# Patient Record
Sex: MALE | Race: WHITE | Employment: OTHER | URBAN - NONMETROPOLITAN AREA
[De-identification: names, ages, dates, MRNs, and addresses within clinical notes are randomized per-mention and may not be internally consistent; named-entity substitution may affect disease eponyms.]

---

## 2017-01-04 ENCOUNTER — OFFICE VISIT (OUTPATIENT)
Dept: CARDIOLOGY | Age: 82
End: 2017-01-04
Payer: MEDICARE

## 2017-01-04 VITALS
HEART RATE: 68 BPM | DIASTOLIC BLOOD PRESSURE: 68 MMHG | SYSTOLIC BLOOD PRESSURE: 130 MMHG | BODY MASS INDEX: 31.15 KG/M2 | HEIGHT: 72 IN | WEIGHT: 230 LBS

## 2017-01-04 DIAGNOSIS — I25.10 CORONARY ARTERY DISEASE INVOLVING NATIVE CORONARY ARTERY OF NATIVE HEART WITHOUT ANGINA PECTORIS: Primary | ICD-10-CM

## 2017-01-04 DIAGNOSIS — I10 ESSENTIAL HYPERTENSION: ICD-10-CM

## 2017-01-04 PROCEDURE — 93000 ELECTROCARDIOGRAM COMPLETE: CPT | Performed by: CLINICAL NURSE SPECIALIST

## 2017-01-04 PROCEDURE — 99213 OFFICE O/P EST LOW 20 MIN: CPT | Performed by: CLINICAL NURSE SPECIALIST

## 2017-01-04 RX ORDER — METFORMIN HYDROCHLORIDE 500 MG/1
500 TABLET, EXTENDED RELEASE ORAL DAILY
COMMUNITY
Start: 2016-12-14 | End: 2020-07-20 | Stop reason: ALTCHOICE

## 2017-06-20 ENCOUNTER — TELEPHONE (OUTPATIENT)
Dept: CARDIOLOGY | Age: 82
End: 2017-06-20

## 2017-07-10 ENCOUNTER — OFFICE VISIT (OUTPATIENT)
Dept: CARDIOLOGY | Age: 82
End: 2017-07-10
Payer: MEDICARE

## 2017-07-10 VITALS
HEART RATE: 70 BPM | BODY MASS INDEX: 30.61 KG/M2 | SYSTOLIC BLOOD PRESSURE: 120 MMHG | HEIGHT: 72 IN | WEIGHT: 226 LBS | DIASTOLIC BLOOD PRESSURE: 68 MMHG

## 2017-07-10 DIAGNOSIS — I25.10 ASHD (ARTERIOSCLEROTIC HEART DISEASE): Primary | ICD-10-CM

## 2017-07-10 DIAGNOSIS — I10 ESSENTIAL HYPERTENSION: ICD-10-CM

## 2017-07-10 PROCEDURE — 99213 OFFICE O/P EST LOW 20 MIN: CPT | Performed by: INTERNAL MEDICINE

## 2017-07-10 RX ORDER — FINASTERIDE 5 MG/1
5 TABLET, FILM COATED ORAL DAILY
COMMUNITY
Start: 2017-06-28

## 2017-10-20 DIAGNOSIS — I71.40 AAA (ABDOMINAL AORTIC ANEURYSM) WITHOUT RUPTURE: ICD-10-CM

## 2017-10-20 DIAGNOSIS — I73.9 PERIPHERAL VASCULAR DISEASE (HCC): Primary | ICD-10-CM

## 2017-12-05 ENCOUNTER — OFFICE VISIT (OUTPATIENT)
Dept: VASCULAR SURGERY | Age: 82
End: 2017-12-05
Payer: MEDICARE

## 2017-12-05 ENCOUNTER — HOSPITAL ENCOUNTER (OUTPATIENT)
Dept: VASCULAR LAB | Age: 82
Discharge: HOME | End: 2017-12-05
Payer: MEDICARE

## 2017-12-05 ENCOUNTER — HOSPITAL ENCOUNTER (OUTPATIENT)
Dept: ULTRASOUND IMAGING | Age: 82
Discharge: HOME | End: 2017-12-05
Payer: MEDICARE

## 2017-12-05 VITALS — HEIGHT: 72 IN | DIASTOLIC BLOOD PRESSURE: 67 MMHG | SYSTOLIC BLOOD PRESSURE: 135 MMHG | HEART RATE: 61 BPM

## 2017-12-05 DIAGNOSIS — I65.23 BILATERAL CAROTID ARTERY STENOSIS: ICD-10-CM

## 2017-12-05 DIAGNOSIS — I73.9 PERIPHERAL VASCULAR DISEASE (HCC): ICD-10-CM

## 2017-12-05 DIAGNOSIS — I71.40 AAA (ABDOMINAL AORTIC ANEURYSM) WITHOUT RUPTURE: ICD-10-CM

## 2017-12-05 DIAGNOSIS — I71.40 AAA (ABDOMINAL AORTIC ANEURYSM) WITHOUT RUPTURE: Primary | ICD-10-CM

## 2017-12-05 DIAGNOSIS — I70.213 ATHEROSCLEROSIS OF NATIVE ARTERY OF BOTH LOWER EXTREMITIES WITH INTERMITTENT CLAUDICATION (HCC): ICD-10-CM

## 2017-12-05 PROCEDURE — 93880 EXTRACRANIAL BILAT STUDY: CPT

## 2017-12-05 PROCEDURE — G8598 ASA/ANTIPLAT THER USED: HCPCS | Performed by: NURSE PRACTITIONER

## 2017-12-05 PROCEDURE — G8417 CALC BMI ABV UP PARAM F/U: HCPCS | Performed by: NURSE PRACTITIONER

## 2017-12-05 PROCEDURE — 93923 UPR/LXTR ART STDY 3+ LVLS: CPT

## 2017-12-05 PROCEDURE — G8484 FLU IMMUNIZE NO ADMIN: HCPCS | Performed by: NURSE PRACTITIONER

## 2017-12-05 PROCEDURE — 4040F PNEUMOC VAC/ADMIN/RCVD: CPT | Performed by: NURSE PRACTITIONER

## 2017-12-05 PROCEDURE — 1036F TOBACCO NON-USER: CPT | Performed by: NURSE PRACTITIONER

## 2017-12-05 PROCEDURE — 76775 US EXAM ABDO BACK WALL LIM: CPT

## 2017-12-05 PROCEDURE — 1123F ACP DISCUSS/DSCN MKR DOCD: CPT | Performed by: NURSE PRACTITIONER

## 2017-12-05 PROCEDURE — 99213 OFFICE O/P EST LOW 20 MIN: CPT | Performed by: NURSE PRACTITIONER

## 2017-12-05 PROCEDURE — G8427 DOCREV CUR MEDS BY ELIG CLIN: HCPCS | Performed by: NURSE PRACTITIONER

## 2017-12-05 NOTE — PROGRESS NOTES
Bilateral carotid artery stenosis          Plan    Start/Continue ASA EC 81 mg daily  Education about caludication causes and treatment discussed  Call with any new wound development or progressive claudication  Walking program  Leg elevation  Good moisturization  Good skin care  Follow up in  1  Year(s) with cvls and amy and aortic u/s  Recommend no smoking  Strongly encourage statin therapy

## 2018-01-08 ENCOUNTER — TELEPHONE (OUTPATIENT)
Dept: CARDIOLOGY | Age: 83
End: 2018-01-08

## 2018-01-09 ENCOUNTER — OFFICE VISIT (OUTPATIENT)
Dept: CARDIOLOGY | Age: 83
End: 2018-01-09
Payer: MEDICARE

## 2018-01-09 VITALS
WEIGHT: 230 LBS | BODY MASS INDEX: 31.15 KG/M2 | DIASTOLIC BLOOD PRESSURE: 60 MMHG | SYSTOLIC BLOOD PRESSURE: 130 MMHG | HEART RATE: 70 BPM | HEIGHT: 72 IN

## 2018-01-09 DIAGNOSIS — I10 ESSENTIAL HYPERTENSION: ICD-10-CM

## 2018-01-09 DIAGNOSIS — I25.10 CORONARY ARTERY DISEASE INVOLVING NATIVE CORONARY ARTERY OF NATIVE HEART WITHOUT ANGINA PECTORIS: Primary | ICD-10-CM

## 2018-01-09 PROCEDURE — G8598 ASA/ANTIPLAT THER USED: HCPCS | Performed by: CLINICAL NURSE SPECIALIST

## 2018-01-09 PROCEDURE — G8484 FLU IMMUNIZE NO ADMIN: HCPCS | Performed by: CLINICAL NURSE SPECIALIST

## 2018-01-09 PROCEDURE — G8427 DOCREV CUR MEDS BY ELIG CLIN: HCPCS | Performed by: CLINICAL NURSE SPECIALIST

## 2018-01-09 PROCEDURE — 1123F ACP DISCUSS/DSCN MKR DOCD: CPT | Performed by: CLINICAL NURSE SPECIALIST

## 2018-01-09 PROCEDURE — 1036F TOBACCO NON-USER: CPT | Performed by: CLINICAL NURSE SPECIALIST

## 2018-01-09 PROCEDURE — G8417 CALC BMI ABV UP PARAM F/U: HCPCS | Performed by: CLINICAL NURSE SPECIALIST

## 2018-01-09 PROCEDURE — 99213 OFFICE O/P EST LOW 20 MIN: CPT | Performed by: CLINICAL NURSE SPECIALIST

## 2018-01-09 PROCEDURE — 4040F PNEUMOC VAC/ADMIN/RCVD: CPT | Performed by: CLINICAL NURSE SPECIALIST

## 2018-01-09 RX ORDER — TIMOLOL MALEATE 5 MG/ML
1 SOLUTION/ DROPS OPHTHALMIC DAILY
COMMUNITY
Start: 2017-10-23

## 2018-01-09 NOTE — PROGRESS NOTES
[facility], [address] [facility] [address] [place] [address]  Phone: [phone]  Fax: [phone]    OFFICE VISIT:  2018    Tristan Diaz - : 1925    Reason For Visit:  Lilian Pollack is a 80 y.o. male who is here for 6 Month Follow-Up (Patient denies any cardiac symptoms.) and Coronary Artery Disease  1999 CABG x4  2011- negative stress test     Subjective  Lilian Pollack denies exertional chest pain, shortness of breath, orthopnea, paroxysmal nocturnal dyspnea, syncope, presyncope, arrhythmia. He has some mile ankel edema. The patient denies numbness or weakness to suggest cerebrovascular accident or transient ischemic attack. [facility] Suarez [facility]. Tristan Diaz has the following history as recorded in [facility]:    Patient Active Problem List    Diagnosis Date Noted    Atherosclerosis of native artery of both lower extremities with intermittent claudication ([address] 2015    Carotid artery stenosis 2012    AAA (abdominal aortic aneurysm) without rupture ([address] 2012    CAD (coronary artery disease)     Hyperlipidemia     Hypertension     Peripheral vascular disease ([address]      Past Medical History:   Diagnosis Date    AAA (abdominal aortic aneurysm) without rupture (HCC)     Angina      Arrhythmia     with both atrial and ventricular premature.  ASCVD (arteriosclerotic cardiovascular disease)     Atrophy of prostate     BPH (benign prostatic hyperplasia)     with chronic obstructive symptoms.  CAD (coronary artery disease)     s/p CABG, Dr. Thresa Halsted.  Carotid artery occlusion     Chronic kidney disease     Elevated CK     with borderline elevated troponin I with no evidence of acute MI.    Fatigue     Hyperlipidemia     Hypertension     IHD (ischemic heart disease)     hx of    Normocytic anemia     consistent with anemia of chronic disease.     Obesity     [facility]     PVD (peripheral vascular disease) [facility]     SOB (shortness of breath)     Tobacco abuse     remote    UTI (urinary tract infection) 04/2011     Past Surgical History:   Procedure Laterality Date    CARDIAC CATHETERIZATION  06/21/1999     EF 70%. See scanned document.  CAROTID ENDARTERECTOMY  2003    right    CORONARY ARTERY BYPASS GRAFT  1999    4 vessel    INGUINAL HERNIA REPAIR  2004    right     Family History   Problem Relation Age of Onset    Heart Attack Mother     Other Brother      ruptured AAA    Other Other      Family hx of Emphysema and Hypertension. Social History   Substance Use Topics    Smoking status: Former Smoker     Quit date: 8/16/1960    Smokeless tobacco: Never Used    Alcohol use No      Current Outpatient Prescriptions   Medication Sig Dispense Refill    timolol (TIMOPTIC) 0.5 % ophthalmic solution Place 1 drop into both eyes daily       finasteride (PROSCAR) 5 MG tablet 5 mg      metFORMIN (GLUCOPHAGE-XR) 500 MG extended release tablet Take 500 mg by mouth daily      hydrochlorothiazide (HYDRODIURIL) 25 MG tablet Take 25 mg by mouth daily      losartan (COZAAR) 25 MG tablet Take 25 mg by mouth daily      atenolol (TENORMIN) 50 MG tablet Take 50 mg by mouth daily.  pravastatin (PRAVACHOL) 40 MG tablet Take 40 mg by mouth daily.  Multiple Vitamins-Minerals (CENTRUM SILVER PO) Take  by mouth daily.  aspirin 81 MG EC tablet Take 81 mg by mouth daily.  terazosin (HYTRIN) 5 MG capsule Take 5 mg by mouth nightly.  isosorbide mononitrate (IMDUR) 30 MG CR tablet Take 30 mg by mouth daily. No current facility-administered medications for this visit. Allergies: Lisinopril    Review of Systems  Constitutional  no significant activity change, appetite change, or unexpected weight change. No fever, chills or diaphoresis. No fatigue. HEENT  no significant rhinorrhea or epistaxis.  No tinnitus or significant

## 2018-01-09 NOTE — PATIENT INSTRUCTIONS
Follow up in 6 mos With Dr. Juany Fletcher  Call with any questions or concerns  Follow up with Joellen Azevedo for non cardiac problems  Report any new problems  Cardiovascular Fitness-Exercise as tolerated. Strive for 15 minutes of exercise most days of the week. Cardiac / Healthy Diet  Continue current medications as directed  Continue plan of treatment  It is always recommended that you bring your medications bottles with you to each visit - this is for your safety!

## 2018-07-09 ENCOUNTER — OFFICE VISIT (OUTPATIENT)
Dept: CARDIOLOGY | Age: 83
End: 2018-07-09
Payer: MEDICARE

## 2018-07-09 VITALS
HEART RATE: 68 BPM | BODY MASS INDEX: 31.15 KG/M2 | WEIGHT: 230 LBS | SYSTOLIC BLOOD PRESSURE: 130 MMHG | DIASTOLIC BLOOD PRESSURE: 70 MMHG | HEIGHT: 72 IN

## 2018-07-09 DIAGNOSIS — I10 ESSENTIAL HYPERTENSION: ICD-10-CM

## 2018-07-09 DIAGNOSIS — I25.10 ATHEROSCLEROTIC CARDIOVASCULAR DISEASE: Primary | ICD-10-CM

## 2018-07-09 PROCEDURE — 4040F PNEUMOC VAC/ADMIN/RCVD: CPT | Performed by: INTERNAL MEDICINE

## 2018-07-09 PROCEDURE — 1123F ACP DISCUSS/DSCN MKR DOCD: CPT | Performed by: INTERNAL MEDICINE

## 2018-07-09 PROCEDURE — G8598 ASA/ANTIPLAT THER USED: HCPCS | Performed by: INTERNAL MEDICINE

## 2018-07-09 PROCEDURE — 1036F TOBACCO NON-USER: CPT | Performed by: INTERNAL MEDICINE

## 2018-07-09 PROCEDURE — 99213 OFFICE O/P EST LOW 20 MIN: CPT | Performed by: INTERNAL MEDICINE

## 2018-07-09 PROCEDURE — 1101F PT FALLS ASSESS-DOCD LE1/YR: CPT | Performed by: INTERNAL MEDICINE

## 2018-07-09 PROCEDURE — G8427 DOCREV CUR MEDS BY ELIG CLIN: HCPCS | Performed by: INTERNAL MEDICINE

## 2018-07-09 PROCEDURE — G8417 CALC BMI ABV UP PARAM F/U: HCPCS | Performed by: INTERNAL MEDICINE

## 2018-07-09 RX ORDER — HYDROCHLOROTHIAZIDE 12.5 MG/1
12.5 TABLET ORAL DAILY
COMMUNITY

## 2018-07-16 NOTE — PROGRESS NOTES
[address] [facility], [address] [place] [address], [address] [place] [address], [address] [place]  The following was transcribed by Peterson Slaughter M.T. Martha Lee : 1925, 80 y.o. Male        Office Visit:  2018    Chief Complaint   Patient presents with    1 Year Follow Up     thinks he is doing very well. States he is a [employer] man, [place].  Coronary Artery Disease     Reason for visit:   1. Follow up of coronary artery disease and hypertension. History of Present Illness:   Mr. Martha Lee is 80years old and he reminds me he was in the [place], in the [place] during [employer]. He has no angina, no unusual dyspnea, paroxysmal nocturnal dyspnea, orthopnea, palpitations, syncope or near syncope. Patient Active Problem List   Diagnosis Code    CAD (coronary artery disease) I25.10    Hyperlipidemia E78.5    Hypertension I10    Peripheral vascular disease ([address] I73.9    Carotid artery stenosis I65.29    AAA (abdominal aortic aneurysm) without rupture ([facility] I71.4    Atherosclerosis of native artery of both lower extremities with intermittent claudication (HCC) P07.120     Past Medical History:   Diagnosis Date    AAA (abdominal aortic aneurysm) without rupture ([facility])     Angina      Arrhythmia     with both atrial and ventricular premature.  ASCVD (arteriosclerotic cardiovascular disease)     Atrophy of prostate     BPH (benign prostatic hyperplasia)     with chronic obstructive symptoms.  CAD (coronary artery disease)     s/p CABG, Dr. Manjinder Veloz.  Carotid artery occlusion     Chronic kidney disease     Elevated CK     with borderline elevated troponin I with no evidence of acute MI.    Fatigue     Hyperlipidemia     Hypertension     IHD (ischemic heart disease)     hx of    Normocytic anemia     consistent with anemia of chronic disease.     Obesity     Peripheral vascular disease ([address]     PVD (peripheral vascular disease) ([address]  SOB (shortness of breath)     Tobacco abuse     remote    UTI (urinary tract infection) 04/2011     Past Surgical History:   Procedure Laterality Date    CARDIAC CATHETERIZATION  06/21/1999     EF 70%. See scanned document.  CAROTID ENDARTERECTOMY  2003    right    CORONARY ARTERY BYPASS GRAFT  1999    4 vessel    INGUINAL HERNIA REPAIR  2004    right      His family history includes Heart Attack in his mother; Other in his brother and another family member. Social History   Substance Use Topics    Smoking status: Former Smoker     Quit date: 8/16/1960    Smokeless tobacco: Never Used    Alcohol use No      Allergies   Allergen Reactions    Lisinopril Swelling     Outpatient Prescriptions Marked as Taking for the 7/9/18 encounter (Office Visit) with AIMEE Fair MD   Medication Sig Dispense Refill    hydrochlorothiazide (HYDRODIURIL) 12.5 MG tablet Take 12.5 mg by mouth daily      timolol (TIMOPTIC) 0.5 % ophthalmic solution Place 1 drop into both eyes daily       finasteride (PROSCAR) 5 MG tablet 5 mg      metFORMIN (GLUCOPHAGE-XR) 500 MG extended release tablet Take 500 mg by mouth daily      losartan (COZAAR) 25 MG tablet Take 25 mg by mouth daily      atenolol (TENORMIN) 50 MG tablet Take 50 mg by mouth daily.  pravastatin (PRAVACHOL) 40 MG tablet Take 40 mg by mouth daily.  Multiple Vitamins-Minerals (CENTRUM SILVER PO) Take  by mouth daily.  aspirin 81 MG EC tablet Take 81 mg by mouth daily.  terazosin (HYTRIN) 5 MG capsule Take 5 mg by mouth nightly.  isosorbide mononitrate (IMDUR) 30 MG CR tablet Take 30 mg by mouth daily. I have reviewed and confirm the Past Medical History, Allergies, and Medications sections above and have updated the computerized patient record.      Data:   BP Readings from Last 3 Encounters:   07/09/18 130/70   01/09/18 130/60   12/05/17 135/67    Pulse Readings from Last 3 Encounters:   07/09/18 68   01/09/18 70 12/05/17 61        Review of Systems - As per HPI, otherwise negative. Ten organ review performed. Physical Exam:  Vital Signs:  /70   Pulse 68   Ht 6' (1.829 m)   Wt 230 lb (104.3 kg)   BMI 31.19 kg/m²   Constitutional:  The patient is a pleasant 80 y.o. male in no acute distress. He appears well-developed and well-nourished. HEAD:  Normocephalic without evidence of old or recent trauma. EYES:  Sclerae clear. Conjunctivae pink. EOMs intact. Pupils equal and round. NOSE:  No nasal discharge or epistaxis. MOUTH:  Teeth, gums and palate normal.   THROAT:  No lesions on lips or buccal mucosa. Tongue protrudes in midline and is well papillated. NECK:  There are no carotid bruits. No noted jugular venous distention. No thyromegaly. CHEST:  Clear bilateral breath sounds without wheezes or rhonchi. RESPIRATORY:  The lungs are clear. CARDIOVASCULAR:  The heart's rhythm is regular without audible murmurs or gallops. ABDOMEN:  The abdomen is soft without tenderness or mass. UPPER EXTREMITY EVALUATION:  Radial pulses palpable bilaterally. LOWER EXTREMITY EVALUATION:  Negative for peripheral edema. Femoral, popliteal, dorsalis pedis, and posterior tibialis pulses 2+ to palpation bilaterally. No cyanosis or clubbing. MUSCULOSKELETAL:  Normal muscle strength and tone. No atrophy or abnormalities. SKIN:  Warm, dry, intact. No dermatitis or ulcers. NEUROLOGIC:  Intact cranial nerves II through XII and no focal weakness. Impression / Plan:   1. Coronary artery disease - asymptomatic on current therapy as listed. 2.  Hypertension - blood pressure is well controlled on current therapy as listed. 3.  Continue current medications as prescribed. 4.  Wellness visit in six months. Return to see me in one year.                     _____________________________________________________  Electronically Signed by:   AIMEE Hui M.D., FNicoleACASSIE    [facility]  _____________________________________________________  Copy to pcp: Rubia Roy M.D.

## 2018-12-13 ENCOUNTER — HOSPITAL ENCOUNTER (OUTPATIENT)
Dept: ULTRASOUND IMAGING | Age: 83
Discharge: HOME | End: 2018-12-13
Payer: MEDICARE

## 2018-12-13 ENCOUNTER — HOSPITAL ENCOUNTER (OUTPATIENT)
Dept: VASCULAR LAB | Age: 83
Discharge: HOME | End: 2018-12-13
Payer: MEDICARE

## 2018-12-13 ENCOUNTER — OFFICE VISIT (OUTPATIENT)
Dept: VASCULAR SURGERY | Age: 83
End: 2018-12-13
Payer: MEDICARE

## 2018-12-13 VITALS
DIASTOLIC BLOOD PRESSURE: 76 MMHG | TEMPERATURE: 97.5 F | SYSTOLIC BLOOD PRESSURE: 130 MMHG | HEART RATE: 64 BPM | OXYGEN SATURATION: 97 % | RESPIRATION RATE: 18 BRPM

## 2018-12-13 DIAGNOSIS — I70.213 ATHEROSCLEROSIS OF NATIVE ARTERY OF BOTH LOWER EXTREMITIES WITH INTERMITTENT CLAUDICATION (HCC): ICD-10-CM

## 2018-12-13 DIAGNOSIS — I71.40 AAA (ABDOMINAL AORTIC ANEURYSM) WITHOUT RUPTURE: ICD-10-CM

## 2018-12-13 DIAGNOSIS — I65.23 BILATERAL CAROTID ARTERY STENOSIS: ICD-10-CM

## 2018-12-13 DIAGNOSIS — I73.9 PERIPHERAL VASCULAR DISEASE (HCC): Primary | ICD-10-CM

## 2018-12-13 PROCEDURE — 1123F ACP DISCUSS/DSCN MKR DOCD: CPT | Performed by: PHYSICIAN ASSISTANT

## 2018-12-13 PROCEDURE — 4040F PNEUMOC VAC/ADMIN/RCVD: CPT | Performed by: PHYSICIAN ASSISTANT

## 2018-12-13 PROCEDURE — 93880 EXTRACRANIAL BILAT STUDY: CPT

## 2018-12-13 PROCEDURE — 1036F TOBACCO NON-USER: CPT | Performed by: PHYSICIAN ASSISTANT

## 2018-12-13 PROCEDURE — 1101F PT FALLS ASSESS-DOCD LE1/YR: CPT | Performed by: PHYSICIAN ASSISTANT

## 2018-12-13 PROCEDURE — G8598 ASA/ANTIPLAT THER USED: HCPCS | Performed by: PHYSICIAN ASSISTANT

## 2018-12-13 PROCEDURE — 93978 VASCULAR STUDY: CPT

## 2018-12-13 PROCEDURE — 99213 OFFICE O/P EST LOW 20 MIN: CPT | Performed by: PHYSICIAN ASSISTANT

## 2018-12-13 PROCEDURE — G8417 CALC BMI ABV UP PARAM F/U: HCPCS | Performed by: PHYSICIAN ASSISTANT

## 2018-12-13 PROCEDURE — G8427 DOCREV CUR MEDS BY ELIG CLIN: HCPCS | Performed by: PHYSICIAN ASSISTANT

## 2018-12-13 PROCEDURE — 93923 UPR/LXTR ART STDY 3+ LVLS: CPT

## 2018-12-13 PROCEDURE — G8484 FLU IMMUNIZE NO ADMIN: HCPCS | Performed by: PHYSICIAN ASSISTANT

## 2018-12-13 NOTE — PROGRESS NOTES
no significant shortness of breath, wheezing, or stridor. No apnea, cough, or chest tightness associated with shortness of breath. Cardiovascular - no chest pain, syncope, or significant dizziness. No palpitations or significant leg swelling. Patient reports   no claudication. Gastrointestinal - no abdominal swelling or pain. No blood in stool. No severe constipation, diarrhea, nausea, or vomiting. Genitourinary - No difficulty urinating, dysuria, frequency, or urgency. No flank pain or hematuria. Musculoskeletal - no back pain, gait disturbance, or myalgia. Skin - no color change, rash, pallor, or new wound. Neurologic - no dizziness, facial asymmetry, or light headedness. No seizures. No speech difficulty or lateralizing weakness. Hematologic - no easy bruising or excessive bleeding. Psychiatric - no severe anxiety or nervousness. No confusion. All other review of systems are negative. Physical Exam    /76 Comment: LEFT  Pulse 64   Temp 97.5 °F (36.4 °C)   Resp 18   SpO2 97%     Constitutional - well developed, well nourished. No diaphoresis or acute distress. HENT - head normocephalic. Right external ear canal appears normal.  Left external ear canal appears normal.  Septum appears midline. Eyes - conjunctiva normal.  EOMS normal.  No exudate. No icterus. Neck- ROM appears normal, no tracheal deviation. Cardiovascular - Regular rate and rhythm. Heart sounds are normal.  No murmur, rub, or gallop. Carotid pulses are 2+ to palpation bilaterally without bruit. Extremities - Radial and brachial pulses are 2+ to palpation bilaterally. Femoral pulses are palpable. DP pulse right foot. Left DP and bilateral PT pulses are NP. No cyanosis, clubbing, or significant edema. No signs atheroembolic event. Pulmonary - effort appears normal.  No respiratory distress. Lungs - Breath sounds normal. No wheezes or rales. GI - Abdomen - soft, non tender, bowel sounds X 4 quadrants. No guarding or rebound tenderness. No distension or palpable mass. Genitourinary - deferred. Musculoskeletal - ROM appears normal.  No significant edema. Neurologic - alert and oriented X 3. Physiologic. Skin - warm, dry, and intact. No rash, erythema, or pallor. Psychiatric - mood, affect, and behavior appear normal.  Judgment and thought processes appear normal.    Risk factors for atherosclerosis of all vascular beds have been reviewed with the patient including:  Family history, tobacco abuse in all forms, elevated cholesterol, hyperlipidemia, and diabetes. Lower extremity arterial study:   Right CITLALY NC, Left CITLALY 1.55. Individual films reviewed: Yes. Test results were reviewed with the patient. Disease process is stable      Doppler results:    Right CCA/ICA <50% stenotic  Left CCA/ICA <50% stenotic  Right verterbral artery flow is antegrade  Left verterbral artery flow is antegrade  Individual velocities reviewed: Yes. Results were reviewed with the patient. Disease process is stable    Aortic ultrasound:  3.5 cm abdominal aortic aneurysm. This aneurysm has not changed since the last visit. Individual films reviewed:  Yes. These results have been reviewed with the patient. Options have been discussed with the patient including continued medical management. Patient has opted to proceed with continued medical management. Assessment    1. Peripheral vascular disease ([address]    2. AAA (abdominal aortic aneurysm) without rupture ([address]    3.  Bilateral carotid artery stenosis          Plan    Start/Continue ASA EC 81 mg daily  Strongly encourage statin therapy  Walk as much as possible   Good moisturization  Good skin care  Recommend no smoking    Follow up in  1  Year(s) with cvls and amy and aortic u/s

## 2019-01-14 ENCOUNTER — OFFICE VISIT (OUTPATIENT)
Dept: CARDIOLOGY | Age: 84
End: 2019-01-14
Payer: MEDICARE

## 2019-01-14 VITALS
SYSTOLIC BLOOD PRESSURE: 118 MMHG | DIASTOLIC BLOOD PRESSURE: 62 MMHG | HEART RATE: 68 BPM | BODY MASS INDEX: 30.34 KG/M2 | HEIGHT: 72 IN | WEIGHT: 224 LBS

## 2019-01-14 DIAGNOSIS — I65.23 BILATERAL CAROTID ARTERY STENOSIS: ICD-10-CM

## 2019-01-14 DIAGNOSIS — I25.10 CORONARY ARTERY DISEASE INVOLVING NATIVE CORONARY ARTERY OF NATIVE HEART WITHOUT ANGINA PECTORIS: Primary | ICD-10-CM

## 2019-01-14 DIAGNOSIS — I10 ESSENTIAL HYPERTENSION: ICD-10-CM

## 2019-01-14 DIAGNOSIS — E78.2 MIXED HYPERLIPIDEMIA: ICD-10-CM

## 2019-01-14 DIAGNOSIS — I71.40 AAA (ABDOMINAL AORTIC ANEURYSM) WITHOUT RUPTURE: ICD-10-CM

## 2019-01-14 PROCEDURE — 4040F PNEUMOC VAC/ADMIN/RCVD: CPT | Performed by: CLINICAL NURSE SPECIALIST

## 2019-01-14 PROCEDURE — G8598 ASA/ANTIPLAT THER USED: HCPCS | Performed by: CLINICAL NURSE SPECIALIST

## 2019-01-14 PROCEDURE — G8484 FLU IMMUNIZE NO ADMIN: HCPCS | Performed by: CLINICAL NURSE SPECIALIST

## 2019-01-14 PROCEDURE — 99213 OFFICE O/P EST LOW 20 MIN: CPT | Performed by: CLINICAL NURSE SPECIALIST

## 2019-01-14 PROCEDURE — G8417 CALC BMI ABV UP PARAM F/U: HCPCS | Performed by: CLINICAL NURSE SPECIALIST

## 2019-01-14 PROCEDURE — 1101F PT FALLS ASSESS-DOCD LE1/YR: CPT | Performed by: CLINICAL NURSE SPECIALIST

## 2019-01-14 PROCEDURE — 93000 ELECTROCARDIOGRAM COMPLETE: CPT | Performed by: CLINICAL NURSE SPECIALIST

## 2019-01-14 PROCEDURE — G8427 DOCREV CUR MEDS BY ELIG CLIN: HCPCS | Performed by: CLINICAL NURSE SPECIALIST

## 2019-01-14 PROCEDURE — 1123F ACP DISCUSS/DSCN MKR DOCD: CPT | Performed by: CLINICAL NURSE SPECIALIST

## 2019-01-14 PROCEDURE — 1036F TOBACCO NON-USER: CPT | Performed by: CLINICAL NURSE SPECIALIST

## 2019-01-14 ASSESSMENT — ENCOUNTER SYMPTOMS
FACIAL SWELLING: 0
WHEEZING: 0
COUGH: 0
SHORTNESS OF BREATH: 0
ABDOMINAL PAIN: 0
EYE REDNESS: 0
NAUSEA: 0
CHEST TIGHTNESS: 0
VOMITING: 0

## 2019-07-15 ENCOUNTER — OFFICE VISIT (OUTPATIENT)
Dept: CARDIOLOGY | Age: 84
End: 2019-07-15
Payer: MEDICARE

## 2019-07-15 VITALS
DIASTOLIC BLOOD PRESSURE: 52 MMHG | SYSTOLIC BLOOD PRESSURE: 100 MMHG | HEART RATE: 64 BPM | HEIGHT: 72 IN | WEIGHT: 226 LBS | BODY MASS INDEX: 30.61 KG/M2

## 2019-07-15 DIAGNOSIS — I10 ESSENTIAL HYPERTENSION: ICD-10-CM

## 2019-07-15 DIAGNOSIS — I25.10 CORONARY ARTERY DISEASE INVOLVING NATIVE CORONARY ARTERY OF NATIVE HEART WITHOUT ANGINA PECTORIS: Primary | ICD-10-CM

## 2019-07-15 DIAGNOSIS — E78.2 MIXED HYPERLIPIDEMIA: ICD-10-CM

## 2019-07-15 DIAGNOSIS — I65.23 BILATERAL CAROTID ARTERY STENOSIS: ICD-10-CM

## 2019-07-15 DIAGNOSIS — I71.40 AAA (ABDOMINAL AORTIC ANEURYSM) WITHOUT RUPTURE: ICD-10-CM

## 2019-07-15 PROCEDURE — 4040F PNEUMOC VAC/ADMIN/RCVD: CPT | Performed by: CLINICAL NURSE SPECIALIST

## 2019-07-15 PROCEDURE — G8427 DOCREV CUR MEDS BY ELIG CLIN: HCPCS | Performed by: CLINICAL NURSE SPECIALIST

## 2019-07-15 PROCEDURE — 1123F ACP DISCUSS/DSCN MKR DOCD: CPT | Performed by: CLINICAL NURSE SPECIALIST

## 2019-07-15 PROCEDURE — 99213 OFFICE O/P EST LOW 20 MIN: CPT | Performed by: CLINICAL NURSE SPECIALIST

## 2019-07-15 PROCEDURE — G8417 CALC BMI ABV UP PARAM F/U: HCPCS | Performed by: CLINICAL NURSE SPECIALIST

## 2019-07-15 PROCEDURE — G8598 ASA/ANTIPLAT THER USED: HCPCS | Performed by: CLINICAL NURSE SPECIALIST

## 2019-07-15 PROCEDURE — 1036F TOBACCO NON-USER: CPT | Performed by: CLINICAL NURSE SPECIALIST

## 2019-07-15 ASSESSMENT — ENCOUNTER SYMPTOMS
EYE REDNESS: 0
WHEEZING: 0
CHEST TIGHTNESS: 0
NAUSEA: 0
COUGH: 0
VOMITING: 0
FACIAL SWELLING: 0
ABDOMINAL PAIN: 0
SHORTNESS OF BREATH: 0

## 2019-07-15 NOTE — PATIENT INSTRUCTIONS
Return in about 6 months (around 1/15/2020) for APRN. Call with any questionsor concerns  Follow up with Adela Camilo for non cardiac problems  Report any new problems  Cardiovascular Fitness-Exercise as tolerated. Strive for 15 minutes of exercise most days of the week. Cardiac / HealthyDiet  Continue current medications as directed  Continue plan of treatment  It is always recommended that you bring your medicationsbottles with you to each visit - this is for your safety! Patient Education        Learning About Coronary Artery Disease (CAD)  What is coronary artery disease? Coronary artery disease (CAD) occurs when plaque builds up in the arteries that bring oxygen-rich blood to your heart. Plaque is a fatty substance made of cholesterol, calcium, and other substances in the blood. This process is called hardening of the arteries, or atherosclerosis. What happens when you have coronary artery disease? · Plaque may narrow the coronary arteries. Narrowed arteries cause poor blood flow. This can lead to angina symptoms such as chest pain or discomfort. If blood flow is completely blocked, you could have a heart attack. · You can slow CAD and reduce the risk of future problems by making changes in your lifestyle. These include quitting smoking and eating heart-healthy foods. · Treatments for CAD, along with changes in your lifestyle, can help you live a longer and healthier life. How can you prevent coronary artery disease? · Do not smoke. It may be the best thing you can do to prevent heart disease. If you need help quitting, talk to your doctor about stop-smoking programs and medicines. These can increase your chances of quitting for good. · Be active. Get at least 30 minutes of exercise on most days of the week. Walking is a good choice. You also may want to do other activities, such as running, swimming, cycling, or playing tennis or team sports. · Eat heart-healthy foods.  Eat more fruits and instruction, always ask your healthcare professional. [place] [address] any warranty or liability for your use of this information.

## 2019-12-17 ENCOUNTER — HOSPITAL ENCOUNTER (OUTPATIENT)
Dept: VASCULAR LAB | Age: 84
Discharge: HOME | End: 2019-12-17
Payer: MEDICARE

## 2019-12-17 ENCOUNTER — OFFICE VISIT (OUTPATIENT)
Dept: VASCULAR SURGERY | Age: 84
End: 2019-12-17
Payer: MEDICARE

## 2019-12-17 ENCOUNTER — HOSPITAL ENCOUNTER (OUTPATIENT)
Dept: ULTRASOUND IMAGING | Age: 84
Discharge: HOME | End: 2019-12-17
Payer: MEDICARE

## 2019-12-17 VITALS
OXYGEN SATURATION: 94 % | SYSTOLIC BLOOD PRESSURE: 147 MMHG | HEART RATE: 59 BPM | DIASTOLIC BLOOD PRESSURE: 66 MMHG | TEMPERATURE: 98.6 F | RESPIRATION RATE: 18 BRPM

## 2019-12-17 DIAGNOSIS — I65.23 BILATERAL CAROTID ARTERY STENOSIS: ICD-10-CM

## 2019-12-17 DIAGNOSIS — I71.40 AAA (ABDOMINAL AORTIC ANEURYSM) WITHOUT RUPTURE: ICD-10-CM

## 2019-12-17 DIAGNOSIS — I73.9 PERIPHERAL VASCULAR DISEASE (HCC): ICD-10-CM

## 2019-12-17 DIAGNOSIS — I71.40 AAA (ABDOMINAL AORTIC ANEURYSM) WITHOUT RUPTURE: Primary | ICD-10-CM

## 2019-12-17 PROCEDURE — G8598 ASA/ANTIPLAT THER USED: HCPCS | Performed by: PHYSICIAN ASSISTANT

## 2019-12-17 PROCEDURE — G8417 CALC BMI ABV UP PARAM F/U: HCPCS | Performed by: PHYSICIAN ASSISTANT

## 2019-12-17 PROCEDURE — G8484 FLU IMMUNIZE NO ADMIN: HCPCS | Performed by: PHYSICIAN ASSISTANT

## 2019-12-17 PROCEDURE — 99213 OFFICE O/P EST LOW 20 MIN: CPT | Performed by: PHYSICIAN ASSISTANT

## 2019-12-17 PROCEDURE — 93978 VASCULAR STUDY: CPT

## 2019-12-17 PROCEDURE — 93923 UPR/LXTR ART STDY 3+ LVLS: CPT

## 2019-12-17 PROCEDURE — 4040F PNEUMOC VAC/ADMIN/RCVD: CPT | Performed by: PHYSICIAN ASSISTANT

## 2019-12-17 PROCEDURE — 1036F TOBACCO NON-USER: CPT | Performed by: PHYSICIAN ASSISTANT

## 2019-12-17 PROCEDURE — G8427 DOCREV CUR MEDS BY ELIG CLIN: HCPCS | Performed by: PHYSICIAN ASSISTANT

## 2019-12-17 PROCEDURE — 93880 EXTRACRANIAL BILAT STUDY: CPT

## 2019-12-17 PROCEDURE — 1123F ACP DISCUSS/DSCN MKR DOCD: CPT | Performed by: PHYSICIAN ASSISTANT

## 2020-01-20 ENCOUNTER — OFFICE VISIT (OUTPATIENT)
Dept: CARDIOLOGY | Age: 85
End: 2020-01-20
Payer: MEDICARE

## 2020-01-20 VITALS
HEART RATE: 64 BPM | DIASTOLIC BLOOD PRESSURE: 52 MMHG | SYSTOLIC BLOOD PRESSURE: 126 MMHG | HEIGHT: 72 IN | WEIGHT: 222.8 LBS | BODY MASS INDEX: 30.18 KG/M2

## 2020-01-20 PROCEDURE — G8484 FLU IMMUNIZE NO ADMIN: HCPCS | Performed by: CLINICAL NURSE SPECIALIST

## 2020-01-20 PROCEDURE — 99213 OFFICE O/P EST LOW 20 MIN: CPT | Performed by: CLINICAL NURSE SPECIALIST

## 2020-01-20 PROCEDURE — 1123F ACP DISCUSS/DSCN MKR DOCD: CPT | Performed by: CLINICAL NURSE SPECIALIST

## 2020-01-20 PROCEDURE — 1036F TOBACCO NON-USER: CPT | Performed by: CLINICAL NURSE SPECIALIST

## 2020-01-20 PROCEDURE — G8427 DOCREV CUR MEDS BY ELIG CLIN: HCPCS | Performed by: CLINICAL NURSE SPECIALIST

## 2020-01-20 PROCEDURE — 4040F PNEUMOC VAC/ADMIN/RCVD: CPT | Performed by: CLINICAL NURSE SPECIALIST

## 2020-01-20 PROCEDURE — 93000 ELECTROCARDIOGRAM COMPLETE: CPT | Performed by: CLINICAL NURSE SPECIALIST

## 2020-01-20 PROCEDURE — G8417 CALC BMI ABV UP PARAM F/U: HCPCS | Performed by: CLINICAL NURSE SPECIALIST

## 2020-01-20 ASSESSMENT — ENCOUNTER SYMPTOMS
COUGH: 0
SHORTNESS OF BREATH: 0
WHEEZING: 0
EYE REDNESS: 0
ABDOMINAL PAIN: 0
VOMITING: 0
CHEST TIGHTNESS: 0
NAUSEA: 0
FACIAL SWELLING: 0

## 2020-01-20 NOTE — PATIENT INSTRUCTIONS
Return in about 6 months (around 7/20/2020) for Dr. Joe Siddiqui. Call with any questionsor concerns  Follow up with Ramiro Sherwood for non cardiac problems  Report any new problems  Cardiovascular Fitness-Exercise as tolerated. Strive for 15 minutes of exercise most days of the week. Cardiac / HealthyDiet  Continue current medications as directed  Continue plan of treatment  It is always recommended that you bring your medicationsbottles with you to each visit - this is for your safety!

## 2020-01-20 NOTE — PROGRESS NOTES
[facility], [address], [place]  [address]  Phone: [phone]  Fax: [phone]    OFFICE VISIT:  2020    Hannah Figueroa - : 1925    Reason For Visit:  Regine Le is a 80 y.o. male who is here for Coronary Artery Disease (No cardiac symptoms today. ); Hyperlipidemia; and Hypertension    HPI  Patient is here for follow-up of history of CAD with history of CABG, hypertension, hyperlipidemia. He denies any chest pain, unusual dyspnea, orthopnea, PND, or palpitations. There is mild lower extremity edema which is not a change for patient. He continues to live independently and still drives. He is leaving on a trip for warmer weather today. Sean Lorenz is PCP. Hannah Figueroa has the following history as recorded in [facility]:    Patient Active Problem List    Diagnosis Date Noted    Atherosclerosis of native artery of both lower extremities with intermittent claudication ([address] 2015    Carotid artery stenosis 2012    AAA (abdominal aortic aneurysm) without rupture ([address] 2012    CAD (coronary artery disease)     Hyperlipidemia     Hypertension     Peripheral vascular disease ([address]      Past Medical History:   Diagnosis Date    AAA (abdominal aortic aneurysm) without rupture (HCC)     Angina      Arrhythmia     with both atrial and ventricular premature.  ASCVD (arteriosclerotic cardiovascular disease)     Atrophy of prostate     BPH (benign prostatic hyperplasia)     with chronic obstructive symptoms.  CAD (coronary artery disease)     s/p CABG, Dr. Charbel Dejesus.  Carotid artery occlusion     Chronic kidney disease     Elevated CK     with borderline elevated troponin I with no evidence of acute MI.    Fatigue     Hyperlipidemia     Hypertension     IHD (ischemic heart disease)     hx of    Normocytic anemia     consistent with anemia of chronic disease.     Obesity     Peripheral vascular disease Eyes: Negative for redness and visual disturbance. Respiratory: Negative for cough, chest tightness, shortness of breath and wheezing. Cardiovascular: Positive for leg swelling (mild). Negative for chest pain and palpitations. Gastrointestinal: Negative for abdominal pain, nausea and vomiting. Endocrine: Negative for cold intolerance and heat intolerance. Genitourinary: Negative for dysuria and hematuria. Musculoskeletal: Negative for arthralgias and myalgias. Skin: Negative for pallor and rash. Neurological: Negative for dizziness, seizures, syncope, weakness and light-headedness. Hematological: Does not bruise/bleed easily. Psychiatric/Behavioral: Negative for agitation. The patient is not nervous/anxious. Objective  Vital Signs - BP (!) 126/52   Pulse 64   Ht 6' (1.829 m)   Wt 222 lb 12.8 oz (101.1 kg)   BMI 30.22 kg/m²   Physical Exam  Vitals signs and nursing note reviewed. Constitutional:       General: He is not in acute distress. Appearance: He is well-developed. HENT:      Head: Normocephalic and atraumatic. Eyes:      General:         Right eye: No discharge. Left eye: No discharge. Pupils: Pupils are equal, round, and reactive to light. Neck:      Vascular: No JVD. Trachea: No tracheal deviation. Cardiovascular:      Rate and Rhythm: Normal rate and regular rhythm. Heart sounds: Normal heart sounds. No murmur. No friction rub. No gallop. Comments: No carotid bruit  Pulmonary:      Effort: Pulmonary effort is normal. No respiratory distress. Breath sounds: Normal breath sounds. No wheezing or rales. Abdominal:      Palpations: Abdomen is soft. Tenderness: There is no tenderness. Skin:     General: Skin is warm and dry. Findings: No rash. Neurological:      Mental Status: He is alert and oriented to person, place, and time. Cranial Nerves: No cranial nerve deficit.    Psychiatric:         Behavior: Behavior normal.         Judgment: Judgment normal.         Data:  EKG shows normal sinus rhythm rate 64, unchanged compared to EKG dated 1/14/2019    Assessment:     Diagnosis Orders   1. Coronary artery disease involving native coronary artery of native heart without angina pectoris     2. Essential hypertension  EKG 12 lead   3. Mixed hyperlipidemia     4. Atherosclerosis of native artery of both lower extremities with intermittent claudication ([address]     5. Peripheral vascular disease ([address]          CAD-stable without angina. Continue aspirin, atenolol, isosorbide, losartan, pravastatin    Hypertension-well controlled on current regimen    Hyperlipidemia on statin therapy-managed by PCP. Dr. Yesi Jordan checks labs yearly. Will request most recent labs    Carotid stenosis and AAA-sees vascular surgery regularly for monitoring    Stable cardiovascular status. No evidence of overt heart failure,angina or dysrhythmia. Plan    Orders Placed This Encounter   Procedures    EKG 12 lead     Order Specific Question:   Reason for Exam?     Answer:   Irregular heart rate     Return in about 6 months (around 7/20/2020) for Dr. Jamie Henley- establish care. Call with any questionsor concerns  Follow up with Lisa Oro for non cardiac problems  Report any new problems  Cardiovascular Fitness-Exercise as tolerated. Strive for 15 minutes of exercise most days of the week. Cardiac / HealthyDiet  Continue current medications as directed  Continue plan of treatment  It is always recommended that you bring your medicationsbottles with you to each visit - this is for your safety!        MILTON Terrazas

## 2020-07-20 ENCOUNTER — OFFICE VISIT (OUTPATIENT)
Dept: CARDIOLOGY | Age: 85
End: 2020-07-20
Payer: MEDICARE

## 2020-07-20 VITALS
HEIGHT: 72 IN | HEART RATE: 67 BPM | WEIGHT: 223 LBS | DIASTOLIC BLOOD PRESSURE: 62 MMHG | BODY MASS INDEX: 30.2 KG/M2 | SYSTOLIC BLOOD PRESSURE: 116 MMHG

## 2020-07-20 PROCEDURE — 1123F ACP DISCUSS/DSCN MKR DOCD: CPT | Performed by: INTERNAL MEDICINE

## 2020-07-20 PROCEDURE — 4040F PNEUMOC VAC/ADMIN/RCVD: CPT | Performed by: INTERNAL MEDICINE

## 2020-07-20 PROCEDURE — 93000 ELECTROCARDIOGRAM COMPLETE: CPT | Performed by: INTERNAL MEDICINE

## 2020-07-20 PROCEDURE — 99213 OFFICE O/P EST LOW 20 MIN: CPT | Performed by: INTERNAL MEDICINE

## 2020-07-20 PROCEDURE — G8427 DOCREV CUR MEDS BY ELIG CLIN: HCPCS | Performed by: INTERNAL MEDICINE

## 2020-07-20 PROCEDURE — G8417 CALC BMI ABV UP PARAM F/U: HCPCS | Performed by: INTERNAL MEDICINE

## 2020-07-20 PROCEDURE — 1036F TOBACCO NON-USER: CPT | Performed by: INTERNAL MEDICINE

## 2020-07-20 RX ORDER — ALOGLIPTIN 25 MG/1
25 TABLET, FILM COATED ORAL DAILY
COMMUNITY

## 2020-07-20 NOTE — PROGRESS NOTES
45-year-old gentleman with history of combined dyslipidemia, hypertension, peripheral vascular disease, carotid disease, remote tobacco abuse, chronic kidney disease, and coronary disease returns for routine follow-up. Pertinent history yields the fact that he underwent bypass grafting x4 in 1999.  4 years later in 2003 he had a right carotid endarterectomy. Last stress test for coronary disease was in 2011. Last carotid duplex was in December 2019 with less than 50% of the left internal carotid and no residual plaque on the right. Lower extremity arterial study from December 19 revealed no critical stenosis. Abdominal ultrasound obtained in December 2017 revealed a 3.5 cm infrarenal aortic aneurysm. On return today he denies symptoms of peripheral, cerebrovascular, and coronary disease. Specifically no worsening shortness of breath, symptoms of dysrhythmia, or chest discomfort. He lives alone in the wake of his wife's death 4 years ago. He is aware of the need for social distancing and mask and is pursuing. On exam he carries 223 pounds in a 6 foot frame. Pressure is 116/62 with a pulse of 67. Normal male balding pattern. EOMs full, sclerae and conjunctiva normal. PERRLA. Mask in place. Trachea midline with no neck masses. Assessment of internal jugular veins reveals no elevation of central venous pressure at 45 degrees. Healed right carotid endarterectomy scar. Carotid pulses normal without delay or bruit. Thyroid normal to palpation. Healed midline sternotomy scar. Chest exam reveals normal respiratory effort, no abnormal breath sounds and normal expiratory phase. No skin lesions seen. PMI normal. S1, S2 normal without murmur or belén or click. Normal bowel sounds without palpable mass or bruit. No clubbing or acrocyanosis. No significant lower extremity edema or signs of venous insufficiency. General motor strength appears to be within normal limits. Normal range of motion with normal gait. Alert, oriented x 3, memory and cognition normal as reflected by history and conversation. EKG reveals a sinus mechanism with a first-degree AV block [222 ms] and a minor right ventricular conduction delay along with some nonspecific ST-T wave changes. Assessment/plan:  1. Coronary disease -no ischemic symptoms though it is been 9 years since last stress testing in this now 80year-old. 2.  Dyslipidemia -December 2019 values LDL 44, HDL 28, triglycerides 348. Would pursue the triglycerides were he  Younger. 3.  Hypertension -well-controlled  4. Peripheral, cerebrovascular disease -followed by vascular surgery  5. Social distancing -aware and practicing    Medical records reviewed prior to today's clinic visit including visually reviewing recent diagnostic studies such as ECHOs and angiograms. More than 15 minutes spent face-to-face with patient in evaluating, and carefully explaining problems and the planned approach and the reasons behind the decisions.

## 2020-12-21 ENCOUNTER — HOSPITAL ENCOUNTER (OUTPATIENT)
Dept: VASCULAR LAB | Age: 85
Discharge: HOME | End: 2020-12-21
Payer: MEDICARE

## 2020-12-21 ENCOUNTER — HOSPITAL ENCOUNTER (OUTPATIENT)
Dept: CT IMAGING | Age: 85
Discharge: HOME | End: 2020-12-21
Payer: MEDICARE

## 2020-12-21 ENCOUNTER — OFFICE VISIT (OUTPATIENT)
Dept: VASCULAR SURGERY | Age: 85
End: 2020-12-21
Payer: MEDICARE

## 2020-12-21 VITALS — DIASTOLIC BLOOD PRESSURE: 62 MMHG | SYSTOLIC BLOOD PRESSURE: 124 MMHG

## 2020-12-21 DIAGNOSIS — I73.9 PERIPHERAL VASCULAR DISEASE (HCC): ICD-10-CM

## 2020-12-21 DIAGNOSIS — I65.23 BILATERAL CAROTID ARTERY STENOSIS: ICD-10-CM

## 2020-12-21 DIAGNOSIS — I71.40 AAA (ABDOMINAL AORTIC ANEURYSM) WITHOUT RUPTURE: Primary | ICD-10-CM

## 2020-12-21 PROCEDURE — G8417 CALC BMI ABV UP PARAM F/U: HCPCS | Performed by: PHYSICIAN ASSISTANT

## 2020-12-21 PROCEDURE — G8484 FLU IMMUNIZE NO ADMIN: HCPCS | Performed by: PHYSICIAN ASSISTANT

## 2020-12-21 PROCEDURE — G8427 DOCREV CUR MEDS BY ELIG CLIN: HCPCS | Performed by: PHYSICIAN ASSISTANT

## 2020-12-21 PROCEDURE — 1123F ACP DISCUSS/DSCN MKR DOCD: CPT | Performed by: PHYSICIAN ASSISTANT

## 2020-12-21 PROCEDURE — 93923 UPR/LXTR ART STDY 3+ LVLS: CPT

## 2020-12-21 PROCEDURE — 74176 CT ABD & PELVIS W/O CONTRAST: CPT

## 2020-12-21 PROCEDURE — 93880 EXTRACRANIAL BILAT STUDY: CPT

## 2020-12-21 PROCEDURE — 99213 OFFICE O/P EST LOW 20 MIN: CPT | Performed by: PHYSICIAN ASSISTANT

## 2020-12-21 PROCEDURE — 4040F PNEUMOC VAC/ADMIN/RCVD: CPT | Performed by: PHYSICIAN ASSISTANT

## 2020-12-21 PROCEDURE — 1036F TOBACCO NON-USER: CPT | Performed by: PHYSICIAN ASSISTANT

## 2021-01-20 ENCOUNTER — OFFICE VISIT (OUTPATIENT)
Dept: CARDIOLOGY CLINIC | Age: 86
End: 2021-01-20
Payer: MEDICARE

## 2021-01-20 VITALS
DIASTOLIC BLOOD PRESSURE: 68 MMHG | SYSTOLIC BLOOD PRESSURE: 126 MMHG | HEART RATE: 70 BPM | HEIGHT: 72 IN | BODY MASS INDEX: 30.45 KG/M2 | WEIGHT: 224.8 LBS | OXYGEN SATURATION: 97 %

## 2021-01-20 DIAGNOSIS — I73.9 PERIPHERAL VASCULAR DISEASE (HCC): ICD-10-CM

## 2021-01-20 DIAGNOSIS — I10 ESSENTIAL HYPERTENSION: ICD-10-CM

## 2021-01-20 DIAGNOSIS — E78.2 MIXED HYPERLIPIDEMIA: ICD-10-CM

## 2021-01-20 DIAGNOSIS — I25.10 CORONARY ARTERY DISEASE INVOLVING NATIVE CORONARY ARTERY OF NATIVE HEART WITHOUT ANGINA PECTORIS: Primary | ICD-10-CM

## 2021-01-20 PROCEDURE — 1123F ACP DISCUSS/DSCN MKR DOCD: CPT | Performed by: CLINICAL NURSE SPECIALIST

## 2021-01-20 PROCEDURE — G8427 DOCREV CUR MEDS BY ELIG CLIN: HCPCS | Performed by: CLINICAL NURSE SPECIALIST

## 2021-01-20 PROCEDURE — G8484 FLU IMMUNIZE NO ADMIN: HCPCS | Performed by: CLINICAL NURSE SPECIALIST

## 2021-01-20 PROCEDURE — 99213 OFFICE O/P EST LOW 20 MIN: CPT | Performed by: CLINICAL NURSE SPECIALIST

## 2021-01-20 PROCEDURE — 1036F TOBACCO NON-USER: CPT | Performed by: CLINICAL NURSE SPECIALIST

## 2021-01-20 PROCEDURE — 4040F PNEUMOC VAC/ADMIN/RCVD: CPT | Performed by: CLINICAL NURSE SPECIALIST

## 2021-01-20 PROCEDURE — G8417 CALC BMI ABV UP PARAM F/U: HCPCS | Performed by: CLINICAL NURSE SPECIALIST

## 2021-01-20 NOTE — PROGRESS NOTES
[address] [place], [address] [address]  Phone: [phone]  Fax: [phone]    OFFICE VISIT:  2021    Odalys Albarado - : 1925    Reason For Visit:  Medina Westbrook is a 80 y.o. male who is here for 6 Month Follow-Up (no cardiac issues) and Coronary Artery Disease  History of hyperlipidemia, hypertension, PVD, carotid disease and coronary disease with previous bypass in . Underwent carotid endarterectomy in . Had negative stress test in . He returns today for routine follow-up. He states overall he is active and doing well. No significant change in activity tolerance or concerns for angina    Subjective  Medina Westbrook denies exertional chest pain, shortness of breath, orthopnea, paroxysmal nocturnal dyspnea, syncope, presyncope, arrhythmia, edema and fatigue. The patient denies numbness or weakness to suggest cerebrovascular accident or transient ischemic attack. Leida Ortiz retired  Does not have new  PCP but will probably stay at that office. Also follows with the [address] [place] [address] and they check labs. Odalys Albarado has the following [facility]:    Patient Active Problem List    Diagnosis Date Noted    Atherosclerosis of native artery of both lower extremities with intermittent claudication ([address] 2015    Carotid artery stenosis 2012    AAA (abdominal aortic aneurysm) without rupture ([address] 2012    CAD (coronary artery disease)     Hyperlipidemia     Hypertension     Peripheral vascular disease ([address]      Past Medical History:   Diagnosis Date    AAA (abdominal aortic aneurysm) without rupture (HCC)     Angina      Arrhythmia     with both atrial and ventricular premature.  ASCVD (arteriosclerotic cardiovascular disease)     Atrophy of prostate     BPH (benign prostatic hyperplasia)     with chronic obstructive symptoms.  CAD (coronary artery disease)     s/p CABG, Dr. Gerardo Angel.  Carotid artery occlusion 2003    Chronic kidney disease     Elevated CK 2011    with borderline elevated troponin I with no evidence of acute MI.    Fatigue     Hyperlipidemia     Hypertension     IHD (ischemic heart disease)     hx of    Normocytic anemia     consistent with anemia of chronic disease.  Obesity     Peripheral vascular disease ([address]     PVD (peripheral vascular disease) (HCC)     SOB (shortness of breath)     Tobacco abuse     remote    UTI (urinary tract infection) 04/2011     Past Surgical History:   Procedure Laterality Date    CARDIAC CATHETERIZATION  06/21/1999     EF 70%. See scanned document.  CAROTID ENDARTERECTOMY  2003    right    CORONARY ARTERY BYPASS GRAFT  1999    4 vessel    DIAGNOSTIC CARDIAC CATH LAB PROCEDURE      INGUINAL HERNIA REPAIR  2004    right     Family History   Problem Relation Age of Onset    Heart Attack Mother     Other Brother         ruptured AAA    Other Other         Family hx of Emphysema and Hypertension. Social History     Tobacco Use    Smoking status: Former Smoker     Types: Cigarettes    Smokeless tobacco: Never Used   Substance Use Topics    Alcohol use: No      Current Outpatient Medications   Medication Sig Dispense Refill    alogliptin (NESINA) 25 MG TABS tablet Take 25 mg by mouth daily      hydrochlorothiazide (HYDRODIURIL) 12.5 MG tablet Take 12.5 mg by mouth daily      timolol (TIMOPTIC) 0.5 % ophthalmic solution Place 1 drop into both eyes daily       finasteride (PROSCAR) 5 MG tablet Take 5 mg by mouth daily       losartan (COZAAR) 25 MG tablet Take 25 mg by mouth daily      atenolol (TENORMIN) 50 MG tablet Take 50 mg by mouth daily.  pravastatin (PRAVACHOL) 40 MG tablet Take 40 mg by mouth daily.  Multiple Vitamins-Minerals (CENTRUM SILVER PO) Take 1 tablet by mouth daily       aspirin 81 MG EC tablet Take 81 mg by mouth daily.  terazosin (HYTRIN) 5 MG capsule Take 5 mg by mouth nightly.  isosorbide mononitrate (IMDUR) 30 MG CR tablet Take 30 mg by mouth daily. No current facility-administered medications for this visit. Allergies: Lisinopril    Review of Systems  Constitutional  no significant activity change, appetite change, or unexpected weight change. No fever, chills or diaphoresis. No fatigue. HEENT  no significant rhinorrhea or epistaxis. No tinnitus or significant hearing loss. Eyes  no sudden vision change or amaurosis. Respiratory  no significant wheezing, stridor, apnea or cough. No dyspnea on exertion or shortness of breath. Cardiovascular  no exertional chest pain, orthopnea or PND. No sensation of arrhythmia or slow heart rate. No claudication or leg edema. Gastrointestinal  no abdominal swelling or pain. No blood in stool. No severe constipation, diarrhea, nausea, or vomiting. Genitourinary  no difficulty urinating, dysuria, frequency, or urgency. No flank pain or hematuria. Musculoskeletal  no back pain, gait disturbance, or myalgia. Skin  no color change or rash. No pallor. No new surgical incision. Neurologic  no speech difficulty, facial asymmetry or lateralizing weakness. No seizures, presyncope, syncope, or significant dizziness. Hematologic  no easy bruising or excessive bleeding. Psychiatric  no severe anxiety or insomnia. No confusion. All other review of systems are negative. Objective  Vital Signs - /68   Pulse 70   Ht 6' (1.829 m)   Wt 224 lb 12.8 oz (102 kg)   SpO2 97%   BMI 30.49 kg/m²   General - Dali Fagan is alert, cooperative, and pleasant. Well groomed. No acute distress. Body habitus is overweight. HEENT  The head is normocephalic. No circumoral cyanosis. Dentition is normal.   EYES -  No Xanthelasma, no arcus senilis, no conjunctival hemorrhages or discharge. Neck - Supple, without increased jugular venous pressures. No carotid bruits. No mass. Respiratory - Lungs are clear bilaterally. No wheezes or rales. Normal effort without use of accessory muscles. Cardiovascular  Heart has regular rhythm and rate. No murmurs, rubs or gallops. + pedal pulses and no varicosities. Abdominal -  Soft, nontender, nondistended. Bowel sounds are intact. Extremities - No clubbing, cyanosis, or  edema. Musculoskeletal -  No clubbing . No Osler's nodes. Gait normal .  No kyphosis or scoliosis. Skin -  no statis ulcers or dermatitis. Neurological - No focal signs are identified. Oriented to person, place and time. Psychiatric -  Appropriate affect and mood. Assessment:     Diagnosis Orders   1. Coronary artery disease involving native coronary artery of native heart without angina pectoris     2. Essential hypertension     3. Mixed hyperlipidemia     4. Peripheral vascular disease ([facility]       Data:  BP Readings from Last 3 Encounters:   01/20/21 126/68   12/21/20 124/62   07/20/20 116/62    Pulse Readings from Last 3 Encounters:   01/20/21 70   07/20/20 67   01/20/20 64        Wt Readings from Last 3 Encounters:   01/20/21 224 lb 12.8 oz (102 kg)   07/20/20 223 lb (101.2 kg)   01/20/20 222 lb 12.8 oz (101.1 kg)   80year-old gentleman appears much younger than his age  Pressure and heart rate controlled. Medical management includes ARB, beta-blocker, statin and aspirin. Also takes long-acting nitrate  Overall stays fairly active with no significant change in activity tolerance    Has primary care provider however also follows at the [facility] and they monitor labs. States everything has been stable. Following with vascular surgery for peripheral and carotid disease. States taking medications as prescribed  Stable cardiovascular status. No evidence of overt heart failure, angina or dysrhythmia.      Plan    Follow up in July With Dr. Collins Gonsalves Call with any questions or concerns  Follow up with Denys Klinefelter for non cardiac problems  Report any new problems  Cardiovascular Fitness-Exercise as tolerated. Strive for 30 minutes of exercise most days of the week. Cardiac / Healthy Diet  Continue current medications as directed  Continue plan of treatment  It is always recommended that you bring your medications bottles with you to each visit - this is for your safety! MILTON Silva    EMR dragon/transcription disclaimer: Much of this encounter note is electronic transcription/translation of spoken language to printed tach. Electronic translation of spoken language may be erroneous, or at times, nonsensical words or phrases may be inadvertently transcribed.  Although, I have reviewed the note for such errors, some may still exist.

## 2021-01-20 NOTE — PATIENT INSTRUCTIONS
Follow up in July With Dr. Ernst Bellamy   Call with any questions or concerns  Follow up with Jyotsna Stock for non cardiac problems  Report any new problems  Cardiovascular Fitness-Exercise as tolerated. Strive for 30 minutes of exercise most days of the week. Cardiac / Healthy Diet  Continue current medications as directed  Continue plan of treatment  It is always recommended that you bring your medications bottles with you to each visit - this is for your safety!

## 2021-01-25 NOTE — PROGRESS NOTES
 Multiple Vitamins-Minerals (CENTRUM SILVER PO) Take 1 tablet by mouth daily       aspirin 81 MG EC tablet Take 81 mg by mouth daily.  terazosin (HYTRIN) 5 MG capsule Take 5 mg by mouth nightly.  isosorbide mononitrate (IMDUR) 30 MG CR tablet Take 30 mg by mouth daily. No current facility-administered medications for this visit. Allergies: Lisinopril  Past Medical History:   Diagnosis Date    AAA (abdominal aortic aneurysm) without rupture (HCC)     Angina      Arrhythmia     with both atrial and ventricular premature.  ASCVD (arteriosclerotic cardiovascular disease)     Atrophy of prostate     BPH (benign prostatic hyperplasia)     with chronic obstructive symptoms.  CAD (coronary artery disease) 1999    s/p CABG, Dr. Stu Mann.  Carotid artery occlusion 2003    Chronic kidney disease     Elevated CK 2011    with borderline elevated troponin I with no evidence of acute MI.    Fatigue     Hyperlipidemia     Hypertension     IHD (ischemic heart disease)     hx of    Normocytic anemia     consistent with anemia of chronic disease.  Obesity     Peripheral vascular disease ([address]     PVD (peripheral vascular disease) (HCC)     SOB (shortness of breath)     Tobacco abuse     remote    UTI (urinary tract infection) 04/2011     Past Surgical History:   Procedure Laterality Date    CARDIAC CATHETERIZATION  06/21/1999     EF 70%. See scanned document.  CAROTID ENDARTERECTOMY  2003    right    CORONARY ARTERY BYPASS GRAFT  1999    4 vessel    DIAGNOSTIC CARDIAC CATH LAB PROCEDURE      INGUINAL HERNIA REPAIR  2004    right     Family History   Problem Relation Age of Onset    Heart Attack Mother     Other Brother         ruptured AAA    Other Other         Family hx of Emphysema and Hypertension. Social History     Tobacco Use    Smoking status: Former Smoker     Types: Cigarettes    Smokeless tobacco: Never Used   Substance Use Topics    Alcohol use:  No Extremities - Radial and ulnar pulses are 2+ to palpation bilaterally. Femoral pulses are palpable. DP and PT pulses are palpable. No cyanosis, clubbing, or significant edema. No signs atheroembolic event. Pulmonary  effort appears normal.  No respiratory distress. Lungs - Breath sounds normal. No wheezes or rales. GI - Abdomen  soft, non tender, bowel sounds X 4 quadrants. No guarding or rebound tenderness. No distension or palpable mass. Genitourinary  deferred. Musculoskeletal  ROM appears normal.  No significant edema. Neurologic  alert and oriented X 3. Physiologic. Skin  warm, dry, and intact. No rash, erythema, or pallor. Psychiatric  mood, affect, and behavior appear normal.  Judgment and thought processes appear normal.    Risk factors for atherosclerosis of all vascular beds have been reviewed with the patient including:  Family history, tobacco abuse in all forms, elevated cholesterol, hyperlipidemia, and diabetes. CT A/P -  FINDINGS:   LINES and TUBES: None. LOWER THORAX: Atelectasis/scarring at the lung bases. HEPATOBILIARY:  No focal hepatic lesions.   No liver laceration.   No   biliary ductal dilatation. GALLBLADDER:  No radiopaque stones or sludge.   No wall thickening. SPLEEN:  No splenomegaly.    No splenic laceration. PANCREAS:  No focal masses or ductal dilatation. ADRENALS:  No adrenal nodules. KIDNEYS/URETERS: Vicki Raring are symmetric.   No hydronephrosis.     Bilateral renal hypodensities, incompletely characterized. .  No   stones. GI TRACT:  No abnormal distention, wall thickening, or evidence of   bowel obstruction.   There is no acute appendicitis. Colonic   diverticulosis, without evidence acute diverticulitis. [place] PELVIC ORGANS/BLADDER:  The prostate measures 5 x 5.8 cm. Moderate   wall thickening of the bladder, with diverticula along the lateral   walls bilaterally as well as fat stranding. Consider cystitis. [place] Follow up in 12 months with a CT A/P, ASPEN and CDU or sooner if claudication worsens, patient develops wound or IRP. Patient was instructed to go to ER or call office immediately with any new onset of partial or complete loss of vision affecting only one eye, speech difficulty or lateralizing weakness, numbness/tingling. Symptoms of rupture reviewed with the patient including sudden onset severe back pain or abdominal pain. This pain can sometimes radiate into the groin or leg. The patient may experience a feeling of impending doom or death. If this occurs the patient  has been instructed to call 911 and get to the emergency room telling them you have an aneurysm. Patient has voiced understanding.

## 2021-06-02 ENCOUNTER — TELEPHONE (OUTPATIENT)
Dept: CARDIOLOGY CLINIC | Age: 86
End: 2021-06-02

## 2021-06-02 NOTE — TELEPHONE ENCOUNTER
Called and left message for patient letting them know Dr. Jocelyn Baez will no longer be in the office on mondays and we need to reschedule. If patient calls back please move to next available with Dr. Jocelyn Baez or to see any APRN.

## 2021-09-16 ENCOUNTER — OFFICE VISIT (OUTPATIENT)
Dept: CARDIOLOGY CLINIC | Age: 86
End: 2021-09-16
Payer: MEDICARE

## 2021-09-16 VITALS
WEIGHT: 213 LBS | DIASTOLIC BLOOD PRESSURE: 62 MMHG | SYSTOLIC BLOOD PRESSURE: 120 MMHG | HEIGHT: 72 IN | BODY MASS INDEX: 28.85 KG/M2

## 2021-09-16 DIAGNOSIS — I10 ESSENTIAL HYPERTENSION: ICD-10-CM

## 2021-09-16 DIAGNOSIS — E78.2 MIXED HYPERLIPIDEMIA: ICD-10-CM

## 2021-09-16 DIAGNOSIS — I25.10 CORONARY ARTERY DISEASE INVOLVING NATIVE CORONARY ARTERY OF NATIVE HEART WITHOUT ANGINA PECTORIS: Primary | ICD-10-CM

## 2021-09-16 PROCEDURE — 93000 ELECTROCARDIOGRAM COMPLETE: CPT | Performed by: CLINICAL NURSE SPECIALIST

## 2021-09-16 PROCEDURE — 1123F ACP DISCUSS/DSCN MKR DOCD: CPT | Performed by: CLINICAL NURSE SPECIALIST

## 2021-09-16 PROCEDURE — G8417 CALC BMI ABV UP PARAM F/U: HCPCS | Performed by: CLINICAL NURSE SPECIALIST

## 2021-09-16 PROCEDURE — 4040F PNEUMOC VAC/ADMIN/RCVD: CPT | Performed by: CLINICAL NURSE SPECIALIST

## 2021-09-16 PROCEDURE — 99214 OFFICE O/P EST MOD 30 MIN: CPT | Performed by: CLINICAL NURSE SPECIALIST

## 2021-09-16 PROCEDURE — 1036F TOBACCO NON-USER: CPT | Performed by: CLINICAL NURSE SPECIALIST

## 2021-09-16 PROCEDURE — G8427 DOCREV CUR MEDS BY ELIG CLIN: HCPCS | Performed by: CLINICAL NURSE SPECIALIST

## 2021-09-16 NOTE — PROGRESS NOTES
[id number] [facility]  [facility] [address]  [address]  Phone: [phone]  Fax: [phone]    OFFICE VISIT:  2021    Jatin Calix - : 1925    Reason For Visit:  Lesley Cervantes is a 80 y.o. male who is here for 6 Month Follow-Up (no cardiac symptoms) and Coronary Artery Disease  History of hyperlipidemia, hypertension, PVD, carotid disease and coronary disease with previous bypass in . Underwent carotid endarterectomy in . Had negative stress test in . He returns today for routine follow-up accompanied with a friend. He states overall he is doing well. He denies any cardiac complaints. His friend states that he has had a couple falls. He reports some imbalance but no dizziness        Subjective  Lesley Cervantes denies exertional chest pain, shortness of breath, orthopnea, paroxysmal nocturnal dyspnea, syncope, presyncope, arrhythmia, edema and fatigue. The patient denies numbness or weakness to suggest cerebrovascular accident or transient ischemic attack. Rajendra Junior DO is PCP and follows with the [place] for labs   Jatin Calix has the following history as [facility] in [facility]:    Patient Active Problem List    Diagnosis Date Noted    Atherosclerosis of native artery of both lower extremities with intermittent claudication ([facility] 2015    Carotid artery stenosis 2012    AAA (abdominal aortic aneurysm) without rupture ([facility] 2012    CAD (coronary artery disease)     Hyperlipidemia     Hypertension     Peripheral vascular disease ([facility]      Past Medical History:   Diagnosis Date    AAA (abdominal aortic aneurysm) without rupture (HCC)     Angina      Arrhythmia     with both atrial and ventricular premature.  ASCVD (arteriosclerotic cardiovascular disease)     Atrophy of prostate     BPH (benign prostatic hyperplasia)     with chronic obstructive symptoms.  CAD (coronary artery disease)     s/p CABG, Dr. Flora Ornelas.     Carotid artery occlusion 2003    Chronic kidney disease     Elevated CK 2011    with borderline elevated troponin I with no evidence of acute MI.    Fatigue     Hyperlipidemia     Hypertension     IHD (ischemic heart disease)     hx of    Normocytic anemia     consistent with anemia of chronic disease.  Obesity     Peripheral vascular disease ([address]     PVD (peripheral vascular disease) (HCC)     SOB (shortness of breath)     Tobacco abuse     remote    UTI (urinary tract infection) 04/2011     Past Surgical History:   Procedure Laterality Date    CARDIAC CATHETERIZATION  06/21/1999     EF 70%. See scanned document.  CAROTID ENDARTERECTOMY  2003    right    CORONARY ARTERY BYPASS GRAFT  1999    4 vessel    DIAGNOSTIC CARDIAC CATH LAB PROCEDURE      INGUINAL HERNIA REPAIR  2004    right     Family History   Problem Relation Age of Onset    Heart Attack Mother     Other Brother         ruptured AAA    Other Other         Family hx of Emphysema and Hypertension. Social History     Tobacco Use    Smoking status: Former Smoker     Types: Cigarettes    Smokeless tobacco: Never Used   Substance Use Topics    Alcohol use: No      Current Outpatient Medications   Medication Sig Dispense Refill    alogliptin (NESINA) 25 MG TABS tablet Take 25 mg by mouth daily      hydrochlorothiazide (HYDRODIURIL) 12.5 MG tablet Take 12.5 mg by mouth daily      timolol (TIMOPTIC) 0.5 % ophthalmic solution Place 1 drop into both eyes daily       finasteride (PROSCAR) 5 MG tablet Take 5 mg by mouth daily       losartan (COZAAR) 25 MG tablet Take 25 mg by mouth daily      atenolol (TENORMIN) 50 MG tablet Take 50 mg by mouth daily.  pravastatin (PRAVACHOL) 40 MG tablet Take 40 mg by mouth daily.  Multiple Vitamins-Minerals (CENTRUM SILVER PO) Take 1 tablet by mouth daily       aspirin 81 MG EC tablet Take 81 mg by mouth daily.  terazosin (HYTRIN) 5 MG capsule Take 5 mg by mouth nightly.         answered    Plan    Follow up in 6-9 mos  With Dr. Peterson Blake   Call with any questions or concerns  Follow up with Brock Bautista,  for non cardiac problems  Follow with the  [facility] for labs   Report any new problems  Cardiovascular Fitness-Exercise as tolerated. Fall precautions  Cardiac / Healthy Diet  Continue current medications as directed  Continue plan of treatment  It is always recommended that you bring your medications bottles with you to each visit - this is for your safety! MILTON Butts    EMR dragon/transcription disclaimer: Much of this encounter note is electronic transcription/translation of spoken language to printed tach. Electronic translation of spoken language may be erroneous, or at times, nonsensical words or phrases may be inadvertently transcribed.  Although, I have reviewed the note for such errors, some may still exist.

## 2021-09-16 NOTE — PATIENT INSTRUCTIONS
Follow up in 6-9 mos  With Dr. Carmen Olson   Call with any questions or concerns  Follow up with Nicholas Mario, DO for non cardiac problems  Follow with the  [place] for labs   Report any new problems  Cardiovascular Fitness-Exercise as tolerated. Fall precautions  Cardiac / Healthy Diet  Continue current medications as directed  Continue plan of treatment  It is always recommended that you bring your medications bottles with you to each visit - this is for your safety!